# Patient Record
Sex: FEMALE | Race: WHITE | NOT HISPANIC OR LATINO | Employment: PART TIME | URBAN - METROPOLITAN AREA
[De-identification: names, ages, dates, MRNs, and addresses within clinical notes are randomized per-mention and may not be internally consistent; named-entity substitution may affect disease eponyms.]

---

## 2017-01-26 ENCOUNTER — ALLSCRIPTS OFFICE VISIT (OUTPATIENT)
Dept: OTHER | Facility: OTHER | Age: 27
End: 2017-01-26

## 2017-11-29 ENCOUNTER — GENERIC CONVERSION - ENCOUNTER (OUTPATIENT)
Dept: OTHER | Facility: OTHER | Age: 27
End: 2017-11-29

## 2018-01-11 NOTE — PROGRESS NOTES
Assessment    1  Encounter for preventive health examination (V70 0) (Z00 00)   2  Need for diphtheria-tetanus-pertussis (Tdap) vaccine, adult/adolescent (V06 1) (Z23)   3  Encounter for PPD test (V74 1) (Z11 1)   4  History of Uterine Myomectomy   5  Hepatitis B vaccination status unknown (V49 89) (Z78 9)   6  Measles, mumps, rubella (MMR) vaccination status unknown (V49 89) (Z78 9)   7  Adult BMI 35 0-35 9 kg/sq m (V85 35) (Z68 35)    Plan  Health Maintenance    · Always use a seat belt and shoulder strap when riding or driving a motor vehicle ;  Status:Complete;   Done: 09KCD5627   · Begin a limited exercise program ; Status:Complete;   Done: 68VHL7993   · Diets that are low in carbohydrates and high in protein are very popular for weight loss ;  Status:Complete;   Done: 94ZJZ4997   · Eat a low fat and low cholesterol diet ; Status:Complete;   Done: 29WAO8651   · Reduce your risk of catching or spreading a sexually transmitted disease:;  Status:Complete;   Done: 14PRL8069   · Use a sun block product with an SPF of 15 or more ; Status:Complete;   Done:  86UFJ8305   · We encourage all of our patients to exercise regularly  30 minutes of exercise or physical  activity five or more days a week is recommended for children and adults ;  Status:Complete;   Done: 70DQZ1616   · We recommend that you bring your body mass index down to 26 ; Status:Complete;    Done: 09ISW5268   · We recommend that you examine your own breasts for lumps and other changes ;  Status:Complete;   Done: 64EOW1968   · Call [phone] if: You have any warning signs of skin cancer ; Status:Complete;    Done: 26RTG4064  Hepatitis B vaccination status unknown    · (LC) Hepatitis B Surf Ab Quant; Status:Active; Requested [id number];   Measles, mumps, rubella (MMR) vaccination status unknown    · (LC) Measles/Mumps/Rubella Immunity; Status:Active;  Requested [id number];   Need for diphtheria-tetanus-pertussis (Tdap) vaccine, adult/adolescent · Tdap (Adacel)    Discussion/Summary  health maintenance visit Currently, she eats a poor diet and has an inadequate exercise regimen  cervical cancer screening is current cervical cancer screening is needed every three years next cervical cancer screening is due 2019 Breast cancer screening: monthly self breast exam was advised  The immunizations will be given as outlined in the orders  Advice and education were given regarding nutrition, aerobic exercise, weight loss, reproductive health, contraception, sunscreen use and self skin examination  Pt to check home records for immunizations  Return 6/20/16 for PPD  An order was given for titers if unable to locate immunization records  Chief Complaint    1  Visit For: Preventive General Multisystem Exam  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  She sees [facility] for her well woman exams [email or web address]      History of Present Illness  HM, Adult Female: The patient is being seen for a health maintenance evaluation  The last health maintenance visit was month(s) ago  General Health: The patient's health since the last visit is described as good  She does not have regular dental visits  She complains of vision problems  Vision care includes wearing glasses  Immunizations status: not up to date The patient needs the following immunization(s): tetanus vaccine  Lifestyle:  She consumes a diverse and healthy diet  Diet problems: too high in calories and needs elimination of junk food  She has weight concerns  Weight control issues: overweight  She does not exercise regularly  She does not use tobacco  She consumes alcohol  She denies drug use  Reproductive health: the patient is premenopausal   she reports abnormal menses  Menstrual history: Menstrual Problems: endometriosis  she is sexually active  She is monogamous with a male partner  Screening: Cervical cancer screening includes a pap smear performed March 2015   Breast cancer screening includes irregular breast self-exams performed  Metabolic screening includes lipid profile performed within the past five years, glucose screening performed last year and thyroid function test performed last year  HPI: 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  She finished school in the spring, education major  Recently had a uterine fibroid removed, her OB/GYN is Dr Sigrid Dandy, [facility], last pap in March 2015 which pt states was normal       Review of Systems    Constitutional: No fever, no chills, feels well, no tiredness, no recent weight gain or weight loss  Eyes: as noted in HPI  Cardiovascular: no chest pain, no palpitations and no lower extremity edema  Respiratory: no shortness of breath and no cough  Gastrointestinal: no abdominal pain, no nausea, no vomiting, no diarrhea and no blood in stools  Musculoskeletal: no arthralgias and no myalgias  Integumentary: no rashes and no skin lesions  Neurological: no headache, no numbness, no tingling and no dizziness  Psychiatric: no depression    The patient presents with complaints of occasional episodes of mild anxiety  Active Problems    1  Abnormal ECG (794 31) (R94 31)   2  Allergic rhinitis (477 9) (J30 9)   3  Anxiety disorder (300 00) (F41 9)   4  Backache (724 5) (M54 9)   5  Dysmenorrhea (625 3) (N94 6)   6  Encounter for PPD test (V74 1) (Z11 1)   7  Encounter for routine child health examination w/o abnormal findings (V20 2) (Z00 129)   8  Encounter for routine pelvic examination (V72 31) (Z01 419)   9  Herpes simplex infection (054 9) (B00 9)   10  Irritable bowel syndrome (564 1) (K58 9)   11  Long term use of drug (V58 69) (Z79 899)   12  Near syncope (780 2) (R55)   13  Other specified anxiety disorders (300 09) (F41 8)   14  Palpitations (785 1) (R00 2)   15  Paroxysmal SVT (supraventricular tachycardia) (427 0) (I47 1)   16  Pyelonephritis (590 80) (N12)   17   Shortness of breath dyspnea (786 05) (R06 02)    Past Medical History · History of Abdominal pain, LLQ (left lower quadrant) (789 04) (R10 32)   · History of Acute maxillary sinusitis (461 0) (J01 00)   · Acute upper respiratory infection (465 9) (J06 9)   · History of Ankle Sprain (845 00)   · Encounter for routine child health examination w/o abnormal findings (V20 2) (Z00 129)   · History of abdominal pain (V13 89) (Q56 286)   · History of acute sinusitis (V12 69) (Z87 09)   · History of acute sinusitis (V12 69) (Z87 09)   · History of infectious mononucleosis (V12 09) (Z86 19)   · History of low back pain (V13 59) (Z87 39)   · History of viral gastroenteritis (V12 09) (Z86 19)   · History of Impacted cerumen, unspecified laterality (380 4) (H61 20)   · History of Joint pain, knee (719 46) (M25 569)   · Otitis media, unspecified laterality   · History of Volume Depletion (276 50)    Surgical History    · History of Uterine Myomectomy    Family History  Mother    · Family history of hypertension (V17 49) (Z82 49)  Grandmother    · Family history of Cancer   · Family history of Diabetes  Grandfather    · Family history of CAD (coronary artery disease)   · Family history of High cholesterol  Aunt    · Family history of Arthritis    Social History    · Good sleep hygiene   · Lack of exercise (V69 0) (Z72 3)   · Never A Smoker   · Occasional alcohol use   · Single   · Sleeps 8 - 10 hours a day    Current Meds   1  Aygestin 5 MG Oral Tablet; Take 1 tablet daily Recorded    Allergies    1  No Known Drug Allergies    Vitals   Recorded: 98PDE2787 09:35AM   Temperature 99 2 F   Heart Rate 88   Respiration 16   Systolic 610   Diastolic 70   Height 5 ft 5 8 in   Weight 221 lb    BMI Calculated 35 89   BSA Calculated 2 08   LMP 08-Feb-2016     Physical Exam    Constitutional   General appearance: No acute distress, well appearing and well nourished  Head and Face   Head and face: Normal     Eyes   Conjunctiva and lids: No swelling, erythema or discharge      Pupils and irises: Equal, round, reactive to light  Ears, Nose, Mouth, and Throat   Otoscopic examination: Tympanic membranes translucent with normal light reflex  Canals patent without erythema  Hearing: Normal     Nasal mucosa, septum, and turbinates: Normal without edema or erythema  Oropharynx: Normal with no erythema, edema, exudate or lesions  Neck   Neck: Supple, symmetric, trachea midline, no masses  Thyroid: Normal, no thyromegaly  Pulmonary   Respiratory effort: No increased work of breathing or signs of respiratory distress  Auscultation of lungs: Clear to auscultation  Cardiovascular   Auscultation of heart: Normal rate and rhythm, normal S1 and S2, no murmurs  Carotid pulses: 2+ bilaterally  Pedal pulses: 2+ bilaterally  Examination of extremities for edema and/or varicosities: Normal     Abdomen   Abdomen: Non-tender, no masses  Liver and spleen: No hepatomegaly or splenomegaly  Lymphatic   Palpation of lymph nodes in neck: No lymphadenopathy  Musculoskeletal   Joints, bones, and muscles: Normal     Range of motion: Normal     Muscle strength/tone: Normal     Skin   Skin and subcutaneous tissue: Normal without rashes or lesions  Palpation of skin and subcutaneous tissue: Normal turgor  Neurologic   Cranial nerves: Cranial nerves II-XII intact  Reflexes: 2+ and symmetric  Sensation: No sensory loss  Psychiatric   Mood and affect: Normal        Results/Data  PHQ-2 Adult Depression Screening 17Jun2016 09:38AM User, Ahs     Test Name Result Flag Reference   PHQ-2 Adult Depression Score 0     Over the last two weeks, how often have you been bothered by any of the following problems?   Little interest or pleasure in doing things: Not at all - 0  Feeling down, depressed, or hopeless: Not at all - 0   PHQ-2 Adult Depression Screening Negative         Procedure    Procedure:   Results: 20/15 in both eyes with corrective device, 20/15 in the right eye with corrective device, 20/20 in the left eye with corrective device With glasses      Attending Note  Collaborating Physician Note: Collaborating Physician: I agree with the Advanced Practitioner note        Future Appointments    Date/Time Provider Specialty Site   06/20/2016 09:30 AM 1 [facility]     Signatures   Electronically signed by : Conner Maher; Jun 17 2016 10:25AM EST                       (Author)    Electronically signed by : Cruzito Simmons DO; Jun 17 2016 10:32AM EST                       (Review)

## 2018-01-11 NOTE — RESULT NOTES
Message   Pt is scheduled for PPD read tomorrow 6/22/16  Please let her know that her Hepatitis B titer was low and we will need to repeat the series  She can do her first dose tomorrow with PPD read  Please print titers and attach to her CPE forms  Thanks     Verified Results  [facility]) Measles/Mumps/Rubella Immunity 20Jun2016 01:55PM Reatha Dash     Test Name Result Flag Reference   Rubella Antibodies, IgG 2 03 index  Immune >0 99   Non-immune       <0 90                                                 Equivocal  0 90 - 0 99                                                 Immune           >0 99   Rubeola Ab, IgG 116 0 AU/mL  Immune >29 9   Negative        <25 0                                                  Equivocal 25 0 - 29 9                                                  Positive        >29 9                 Presence of antibodies to Rubeola is presumptive evidence                 of immunity except when acute infection is suspected  Mumps Abs, IgG 97 6 AU/mL  Immune >10 9   Negative         <9 0                                                 Equivocal  9 0 - 10 9                                                 Positive        >10 9                 A positive result generally indicates past exposure to                 Mumps virus or previous vaccination       (LC) Hepatitis B Surf Ab Quant [id number] 01:55PM Reatha Dash     Test Name Result Flag Reference   Hepatitis B Surf Ab Quant <3 1 mIU/mL L Immunity>9 9   Status of Immunity                     Anti-HBs Level                   ------------------                     --------------                 Inconsistent with Immunity                   0 0 - 9 9                 Consistent with Immunity                          >9 9

## 2018-01-13 VITALS
HEART RATE: 80 BPM | WEIGHT: 229 LBS | BODY MASS INDEX: 36.8 KG/M2 | DIASTOLIC BLOOD PRESSURE: 80 MMHG | RESPIRATION RATE: 16 BRPM | TEMPERATURE: 98.2 F | SYSTOLIC BLOOD PRESSURE: 126 MMHG | HEIGHT: 66 IN

## 2018-01-22 VITALS
BODY MASS INDEX: 37.77 KG/M2 | HEIGHT: 66 IN | TEMPERATURE: 97.3 F | DIASTOLIC BLOOD PRESSURE: 84 MMHG | WEIGHT: 235 LBS | RESPIRATION RATE: 16 BRPM | SYSTOLIC BLOOD PRESSURE: 132 MMHG | HEART RATE: 72 BPM

## 2018-02-16 ENCOUNTER — OFFICE VISIT (OUTPATIENT)
Dept: FAMILY MEDICINE CLINIC | Facility: CLINIC | Age: 28
End: 2018-02-16
Payer: COMMERCIAL

## 2018-02-16 VITALS
SYSTOLIC BLOOD PRESSURE: 128 MMHG | WEIGHT: 231 LBS | RESPIRATION RATE: 18 BRPM | HEART RATE: 78 BPM | TEMPERATURE: 98 F | BODY MASS INDEX: 38.49 KG/M2 | DIASTOLIC BLOOD PRESSURE: 80 MMHG | HEIGHT: 65 IN

## 2018-02-16 DIAGNOSIS — F41.1 GENERALIZED ANXIETY DISORDER: Primary | ICD-10-CM

## 2018-02-16 DIAGNOSIS — I47.1 PAROXYSMAL SVT (SUPRAVENTRICULAR TACHYCARDIA) (HCC): ICD-10-CM

## 2018-02-16 PROBLEM — F32.A MILD DEPRESSION: Status: RESOLVED | Noted: 2017-01-26 | Resolved: 2018-02-16

## 2018-02-16 PROBLEM — F32.A MILD DEPRESSION: Status: ACTIVE | Noted: 2017-01-26

## 2018-02-16 PROCEDURE — 3008F BODY MASS INDEX DOCD: CPT | Performed by: NURSE PRACTITIONER

## 2018-02-16 PROCEDURE — 99214 OFFICE O/P EST MOD 30 MIN: CPT | Performed by: NURSE PRACTITIONER

## 2018-02-16 PROCEDURE — 1036F TOBACCO NON-USER: CPT | Performed by: NURSE PRACTITIONER

## 2018-02-16 RX ORDER — PROPRANOLOL HYDROCHLORIDE 20 MG/1
20 TABLET ORAL DAILY
Qty: 30 TABLET | Refills: 2 | Status: SHIPPED | OUTPATIENT
Start: 2018-02-16 | End: 2018-08-27 | Stop reason: SDUPTHER

## 2018-02-16 RX ORDER — IBUPROFEN 600 MG/1
1 TABLET ORAL 2 TIMES DAILY
COMMUNITY
Start: 2017-11-29

## 2018-02-16 NOTE — PROGRESS NOTES
Assessment/Plan:    Anxiety disorder  Recurrent- will start on Propranolol    Total time of visit was 30 minutes, of which 25 minutes was spent counseling  Diagnoses and all orders for this visit:    Generalized anxiety disorder  -     propranolol (INDERAL) 20 mg tablet; Take 1 tablet (20 mg total) by mouth daily    Paroxysmal SVT (supraventricular tachycardia) (HCC)    BMI 38 0-38 9,adult    Other orders  -     Multiple Vitamins-Minerals (MULTIVITAMIN ADULT PO); Take 1 tablet by mouth  -     levonorgestrel (MIRENA) 20 MCG/24HR IUD; 1 each by Intrauterine route          Patient Instructions   Start Propranolol  Will follow up at time of CPE  No Follow-up on file  Subjective:      Patient ID: Afia Parker is a 24 y o  female  Chief Complaint   Patient presents with    Follow-up     discuss medication  [id number]       Here today to discuss recurrent issues with anxiety  Was most recently taking Buspirone, but gradually stopped  She has tried various SSRIs/SNRIs in the past and feels like a zombie on them  Would like to discuss trying Propranolol  Denies depressed feelings  Otherwise feeling well        The following portions of the patient's history were reviewed and updated as appropriate: allergies, current medications, past family history, past medical history, past social history, past surgical history and problem list     Review of Systems   Constitutional: Negative for chills, fatigue and fever  Respiratory: Negative for cough, shortness of breath and wheezing  Cardiovascular: Negative for chest pain, palpitations and leg swelling  Gastrointestinal: Negative for abdominal pain, diarrhea, nausea and vomiting  Skin: Negative for rash  Neurological: Negative for dizziness and headaches     Psychiatric/Behavioral:        Anxiety           Current Outpatient Prescriptions   Medication Sig Dispense Refill    ibuprofen (MOTRIN) 600 mg tablet Take 1 tablet by mouth Twice daily      levonorgestrel (MIRENA) 20 MCG/24HR IUD 1 each by Intrauterine route      Multiple Vitamins-Minerals (MULTIVITAMIN ADULT PO) Take 1 tablet by mouth      propranolol (INDERAL) 20 mg tablet Take 1 tablet (20 mg total) by mouth daily 30 tablet 2     No current facility-administered medications for this visit  Objective:    /80   Pulse 78   Temp 98 °F (36 7 °C)   Resp 18   Ht 5' 5" (1 651 m)   Wt 105 kg (231 lb)   BMI 38 44 kg/m²        Physical Exam   Constitutional: She appears well-developed and well-nourished  HENT:   Right Ear: Tympanic membrane, external ear and ear canal normal    Left Ear: Tympanic membrane, external ear and ear canal normal    Nose: No mucosal edema  Mouth/Throat: Oropharynx is clear and moist and mucous membranes are normal    Eyes: Conjunctivae are normal    Cardiovascular: Normal rate, regular rhythm and normal heart sounds  Pulmonary/Chest: Effort normal and breath sounds normal    Abdominal: Bowel sounds are normal  She exhibits no distension  There is no splenomegaly or hepatomegaly  There is no tenderness  Lymphadenopathy:        Right cervical: No superficial cervical adenopathy present  Left cervical: No superficial cervical adenopathy present  Skin: No rash noted  Psychiatric: She has a normal mood and affect                Maya Pena

## 2018-08-27 DIAGNOSIS — F41.1 GENERALIZED ANXIETY DISORDER: ICD-10-CM

## 2018-08-27 RX ORDER — PROPRANOLOL HYDROCHLORIDE 20 MG/1
20 TABLET ORAL DAILY
Qty: 30 TABLET | Refills: 2 | Status: SHIPPED | OUTPATIENT
Start: 2018-08-27 | End: 2019-10-16 | Stop reason: SDUPTHER

## 2019-04-26 ENCOUNTER — TELEPHONE (OUTPATIENT)
Dept: FAMILY MEDICINE CLINIC | Facility: CLINIC | Age: 29
End: 2019-04-26

## 2019-10-14 DIAGNOSIS — F41.1 GENERALIZED ANXIETY DISORDER: ICD-10-CM

## 2019-10-14 RX ORDER — PROPRANOLOL HYDROCHLORIDE 20 MG/1
20 TABLET ORAL DAILY
Qty: 30 TABLET | Refills: 2 | OUTPATIENT
Start: 2019-10-14

## 2019-10-14 NOTE — TELEPHONE ENCOUNTER
Patient has not been seen in the office since February of 2018  Please call the patient ask her to schedule appointment    Kely Bartholomew DO

## 2019-10-15 PROBLEM — Z87.42 STATUS POST OVARIAN CYSTECTOMY: Status: ACTIVE | Noted: 2019-07-24

## 2019-10-15 PROBLEM — Z98.890 STATUS POST OVARIAN CYSTECTOMY: Status: ACTIVE | Noted: 2019-07-24

## 2019-10-16 ENCOUNTER — OFFICE VISIT (OUTPATIENT)
Dept: FAMILY MEDICINE CLINIC | Facility: CLINIC | Age: 29
End: 2019-10-16
Payer: COMMERCIAL

## 2019-10-16 VITALS
DIASTOLIC BLOOD PRESSURE: 84 MMHG | TEMPERATURE: 99 F | BODY MASS INDEX: 38.49 KG/M2 | WEIGHT: 231 LBS | RESPIRATION RATE: 16 BRPM | SYSTOLIC BLOOD PRESSURE: 124 MMHG | HEART RATE: 100 BPM | HEIGHT: 65 IN

## 2019-10-16 DIAGNOSIS — Z23 NEED FOR VACCINATION: ICD-10-CM

## 2019-10-16 DIAGNOSIS — F41.1 GENERALIZED ANXIETY DISORDER: Primary | ICD-10-CM

## 2019-10-16 PROCEDURE — 90471 IMMUNIZATION ADMIN: CPT

## 2019-10-16 PROCEDURE — 3008F BODY MASS INDEX DOCD: CPT | Performed by: FAMILY MEDICINE

## 2019-10-16 PROCEDURE — 99213 OFFICE O/P EST LOW 20 MIN: CPT | Performed by: FAMILY MEDICINE

## 2019-10-16 PROCEDURE — 90686 IIV4 VACC NO PRSV 0.5 ML IM: CPT

## 2019-10-16 RX ORDER — PROPRANOLOL HYDROCHLORIDE 20 MG/1
20 TABLET ORAL DAILY
Qty: 30 TABLET | Refills: 5 | Status: SHIPPED | OUTPATIENT
Start: 2019-10-16 | End: 2021-04-07

## 2019-10-16 NOTE — PROGRESS NOTES
Assessment/Plan:    1  Generalized anxiety disorder  Assessment & Plan:  Controlled with prn propranolol   Exercise encouraged    Orders:  -     propranolol (INDERAL) 20 mg tablet; Take 1 tablet (20 mg total) by mouth daily    2  Need for vaccination  -     influenza vaccine, quadrivalent, 0 5 mL, preservative-free        BMI Counseling: Body mass index is 38 44 kg/m²  Discussed the patient's BMI with her  The BMI is above normal  Exercise recommendations include exercising 3-5 times per week  There are no Patient Instructions on file for this visit  Return in about 1 year (around 10/16/2020) for Annual physical     Subjective:      Patient ID: Dawood Gabriel is a 34 y o  female  Chief Complaint   Patient presents with    Medication Refill     [facility]       She is doing well on the propranolol as needed  She has a new job as a   She loves it  She has a teaching degree  She needs to medication to help with stress of meetings  She also has got  recently  She did take her medication day of her wedding and it really helped her  The following portions of the patient's history were reviewed and updated as appropriate:  past social history    Review of Systems   Respiratory: Negative  Cardiovascular: Negative  Current Outpatient Medications   Medication Sig Dispense Refill    ibuprofen (MOTRIN) 600 mg tablet Take 1 tablet by mouth Twice daily      levonorgestrel (MIRENA) 20 MCG/24HR IUD 1 each by Intrauterine route      Multiple Vitamins-Minerals (MULTIVITAMIN ADULT PO) Take 1 tablet by mouth      propranolol (INDERAL) 20 mg tablet Take 1 tablet (20 mg total) by mouth daily 30 tablet 5     No current facility-administered medications for this visit          Objective:    /84   Pulse 100   Temp 99 °F (37 2 °C)   Resp 16   Ht 5' 5" (1 651 m)   Wt 105 kg (231 lb)   LMP 10/06/2019 (Exact Date)   BMI 38 44 kg/m²        Physical Exam Constitutional: She appears well-developed and well-nourished  HENT:   Head: Normocephalic and atraumatic  Right Ear: External ear normal    Left Ear: External ear normal    Mouth/Throat: Oropharynx is clear and moist    Cardiovascular: Normal rate, regular rhythm and normal heart sounds  Exam reveals no friction rub  No murmur heard  Pulmonary/Chest: Effort normal and breath sounds normal  No respiratory distress  She has no wheezes  She has no rales  Musculoskeletal: She exhibits no edema or deformity  Nursing note and vitals reviewed               Ara Whyte DO

## 2021-04-06 DIAGNOSIS — F41.1 GENERALIZED ANXIETY DISORDER: ICD-10-CM

## 2021-04-06 NOTE — TELEPHONE ENCOUNTER
Requested medication(s) are due for refill today: Yes  Patient has already received a courtesy refill: No  Other reason request has been forwarded to provider:Failed protocol- has appt scheduled

## 2021-04-07 RX ORDER — PROPRANOLOL HYDROCHLORIDE 20 MG/1
TABLET ORAL
Qty: 30 TABLET | Refills: 0 | Status: SHIPPED | OUTPATIENT
Start: 2021-04-07 | End: 2021-04-26 | Stop reason: SDUPTHER

## 2021-04-26 ENCOUNTER — OFFICE VISIT (OUTPATIENT)
Dept: FAMILY MEDICINE CLINIC | Facility: CLINIC | Age: 31
End: 2021-04-26
Payer: COMMERCIAL

## 2021-04-26 VITALS
SYSTOLIC BLOOD PRESSURE: 120 MMHG | DIASTOLIC BLOOD PRESSURE: 80 MMHG | HEART RATE: 73 BPM | HEIGHT: 66 IN | WEIGHT: 228.6 LBS | TEMPERATURE: 98 F | BODY MASS INDEX: 36.74 KG/M2 | OXYGEN SATURATION: 99 % | RESPIRATION RATE: 18 BRPM

## 2021-04-26 DIAGNOSIS — F41.1 GENERALIZED ANXIETY DISORDER: Primary | ICD-10-CM

## 2021-04-26 PROCEDURE — 99213 OFFICE O/P EST LOW 20 MIN: CPT | Performed by: FAMILY MEDICINE

## 2021-04-26 PROCEDURE — 3008F BODY MASS INDEX DOCD: CPT | Performed by: FAMILY MEDICINE

## 2021-04-26 PROCEDURE — 1036F TOBACCO NON-USER: CPT | Performed by: FAMILY MEDICINE

## 2021-04-26 PROCEDURE — 3725F SCREEN DEPRESSION PERFORMED: CPT | Performed by: FAMILY MEDICINE

## 2021-04-26 RX ORDER — PROPRANOLOL HYDROCHLORIDE 20 MG/1
20 TABLET ORAL DAILY
Qty: 30 TABLET | Refills: 5 | Status: SHIPPED | OUTPATIENT
Start: 2021-04-26 | End: 2022-03-21 | Stop reason: SDUPTHER

## 2021-04-26 NOTE — PROGRESS NOTES
Assessment/Plan:    1  Generalized anxiety disorder  Assessment & Plan:  Stable   Continue propranolol     Orders:  -     propranolol (INDERAL) 20 mg tablet; Take 1 tablet (20 mg total) by mouth daily    BMI Counseling: Body mass index is 36 9 kg/m²  The BMI is above normal  Exercise recommendations include exercising 3-5 times per week  There are no Patient Instructions on file for this visit  Return in about 1 year (around 4/26/2022) for Annual physical     Subjective:      Patient ID: Karina Vargas is a 31 y o  female  Chief Complaint   Patient presents with    Medication Management     [facility]       She has spent the past year inside due to [email or web address]  She was able to get vaccinated  She is getting anxiety occasionally  The following portions of the patient's history were reviewed and updated as appropriate:  past social history    Review of Systems      Current Outpatient Medications   Medication Sig Dispense Refill    ibuprofen (MOTRIN) 600 mg tablet Take 1 tablet by mouth Twice daily      levonorgestrel (MIRENA) 20 MCG/24HR IUD 1 each by Intrauterine route      Multiple Vitamins-Minerals (MULTIVITAMIN ADULT PO) Take 1 tablet by mouth      propranolol (INDERAL) 20 mg tablet Take 1 tablet (20 mg total) by mouth daily 30 tablet 5     No current facility-administered medications for this visit  Objective:    /80   Pulse 73   Temp 98 °F (36 7 °C)   Resp 18   Ht 5' 6" (1 676 m)   Wt 104 kg (228 lb 9 6 oz)   LMP 04/06/2021 (Exact Date)   SpO2 99%   BMI 36 90 kg/m²      Physical Exam  Vitals signs and nursing note reviewed  Constitutional:       Appearance: She is well-developed  HENT:      Head: Normocephalic and atraumatic  Right Ear: Tympanic membrane and external ear normal       Left Ear: Tympanic membrane and external ear normal    Cardiovascular:      Rate and Rhythm: Normal rate and regular rhythm  Heart sounds: Normal heart sounds  No murmur   No friction rub  Pulmonary:      Effort: Pulmonary effort is normal  No respiratory distress  Breath sounds: Normal breath sounds  No wheezing or rales  Musculoskeletal:      Right lower leg: No edema  Left lower leg: No edema               Doris Butler DO

## 2021-05-03 ENCOUNTER — TELEPHONE (OUTPATIENT)
Dept: ADMINISTRATIVE | Facility: OTHER | Age: 31
End: 2021-05-03

## 2021-05-03 NOTE — TELEPHONE ENCOUNTER
----- Message from Shelia Guerrero DO sent at 4/26/2021  4:19 PM EDT -----  04/26/21 4:19 PM    Hello, our patient Ghazal Nguyen has had Pap Smear (HPV) aka Cervical Cancer Screening completed/performed  Please assist in updating the patient chart by updated in Care Everywhere The date of service is August 2020      Thank you,  Shelia Guerrero DO  [facility]

## 2021-05-03 NOTE — TELEPHONE ENCOUNTER
Upon review of the In Basket request we were able to locate, review, and update the patient chart as requested for Pap Smear (HPV) aka Cervical Cancer Screening  Any additional questions or concerns should be emailed to the Practice Liaisons via [email or web address] email, please do not reply via In Basket      Thank you  Neal Alberto MA

## 2021-12-23 ENCOUNTER — TELEMEDICINE (OUTPATIENT)
Dept: FAMILY MEDICINE CLINIC | Facility: CLINIC | Age: 31
End: 2021-12-23
Payer: COMMERCIAL

## 2021-12-23 DIAGNOSIS — B34.9 VIRAL INFECTION, UNSPECIFIED: Primary | ICD-10-CM

## 2021-12-23 PROCEDURE — 1036F TOBACCO NON-USER: CPT | Performed by: NURSE PRACTITIONER

## 2021-12-23 PROCEDURE — 87636 SARSCOV2 & INF A&B AMP PRB: CPT | Performed by: NURSE PRACTITIONER

## 2021-12-23 PROCEDURE — 99213 OFFICE O/P EST LOW 20 MIN: CPT | Performed by: NURSE PRACTITIONER

## 2022-03-21 DIAGNOSIS — F41.1 GENERALIZED ANXIETY DISORDER: ICD-10-CM

## 2022-03-21 RX ORDER — PROPRANOLOL HYDROCHLORIDE 20 MG/1
20 TABLET ORAL DAILY
Qty: 30 TABLET | Refills: 3 | Status: SHIPPED | OUTPATIENT
Start: 2022-03-21 | End: 2022-05-27